# Patient Record
Sex: MALE | Race: WHITE | URBAN - METROPOLITAN AREA
[De-identification: names, ages, dates, MRNs, and addresses within clinical notes are randomized per-mention and may not be internally consistent; named-entity substitution may affect disease eponyms.]

---

## 2020-06-09 ENCOUNTER — OFFICE VISIT (OUTPATIENT)
Dept: URBAN - METROPOLITAN AREA CLINIC 54 | Facility: CLINIC | Age: 55
End: 2020-06-09

## 2020-06-26 ENCOUNTER — TELEPHONE ENCOUNTER (OUTPATIENT)
Dept: URBAN - METROPOLITAN AREA CLINIC 54 | Facility: CLINIC | Age: 55
End: 2020-06-26

## 2020-06-26 RX ORDER — LISINOPRIL 10 MG/1
TABLET ORAL
Qty: 0 | Refills: 0 | Status: ACTIVE | COMMUNITY
Start: 1900-01-01 | End: 1900-01-01

## 2020-06-26 RX ORDER — DICYCLOMINE HYDROCHLORIDE 20 MG/1
TAKE 1 TABLET (20 MG) BY ORAL ROUTE 3 TIMES PER DAY FOR 30 DAYS TABLET ORAL
Qty: 90 | Refills: 7 | Status: ACTIVE | COMMUNITY
Start: 2020-02-07 | End: 2020-10-04

## 2020-06-26 RX ORDER — VEDOLIZUMAB 300 MG/5ML
INFUSE 300 MG OVER 30 MINUTE(S) BY INTRAVENOUS ROUTE EVERY 8 WEEKS INJECTION, POWDER, LYOPHILIZED, FOR SOLUTION INTRAVENOUS
Qty: 1 | Refills: 11 | Status: ACTIVE | COMMUNITY
Start: 2020-02-10 | End: 1900-01-01

## 2020-06-26 RX ORDER — TOPIRAMATE 25 MG/1
TABLET, COATED ORAL
Qty: 0 | Refills: 0 | Status: ACTIVE | COMMUNITY
Start: 1900-01-01 | End: 1900-01-01

## 2020-06-26 RX ORDER — LANSOPRAZOLE 30 MG/1
1 CAPSULE CAPSULE, DELAYED RELEASE ORAL ONCE A DAY
Qty: 90 | Refills: 1 | OUTPATIENT
Start: 2020-06-26

## 2020-06-30 ENCOUNTER — OFFICE VISIT (OUTPATIENT)
Dept: URBAN - METROPOLITAN AREA SURGERY CENTER 14 | Facility: SURGERY CENTER | Age: 55
End: 2020-06-30

## 2020-07-14 ENCOUNTER — TELEPHONE ENCOUNTER (OUTPATIENT)
Dept: URBAN - METROPOLITAN AREA CLINIC 92 | Facility: CLINIC | Age: 55
End: 2020-07-14

## 2020-07-14 RX ORDER — LANSOPRAZOLE 30 MG/1
1 CAPSULE CAPSULE, DELAYED RELEASE ORAL ONCE A DAY
Qty: 90
Start: 2020-06-26

## 2020-07-24 ENCOUNTER — OFFICE VISIT (OUTPATIENT)
Dept: URBAN - METROPOLITAN AREA CLINIC 53 | Facility: CLINIC | Age: 55
End: 2020-07-24
Payer: COMMERCIAL

## 2020-07-24 DIAGNOSIS — K51.80 OTHER ULCERATIVE COLITIS WITHOUT COMPLICATION: ICD-10-CM

## 2020-07-24 PROCEDURE — 96413 CHEMO IV INFUSION 1 HR: CPT | Performed by: INTERNAL MEDICINE

## 2020-07-24 RX ORDER — TOPIRAMATE 25 MG/1
TABLET, COATED ORAL
Qty: 0 | Refills: 0 | Status: ACTIVE | COMMUNITY
Start: 1900-01-01 | End: 1900-01-01

## 2020-07-24 RX ORDER — LANSOPRAZOLE 30 MG/1
1 CAPSULE CAPSULE, DELAYED RELEASE ORAL ONCE A DAY
Qty: 90 | Status: ACTIVE | COMMUNITY
Start: 2020-06-26

## 2020-07-24 RX ORDER — VEDOLIZUMAB 300 MG/5ML
INFUSE 300 MG OVER 30 MINUTE(S) BY INTRAVENOUS ROUTE EVERY 8 WEEKS INJECTION, POWDER, LYOPHILIZED, FOR SOLUTION INTRAVENOUS
Qty: 1 | Refills: 11 | Status: ACTIVE | COMMUNITY
Start: 2020-02-10 | End: 1900-01-01

## 2020-07-24 RX ORDER — LISINOPRIL 10 MG/1
TABLET ORAL
Qty: 0 | Refills: 0 | Status: ACTIVE | COMMUNITY
Start: 1900-01-01 | End: 1900-01-01

## 2020-07-24 RX ORDER — DICYCLOMINE HYDROCHLORIDE 20 MG/1
TAKE 1 TABLET (20 MG) BY ORAL ROUTE 3 TIMES PER DAY FOR 30 DAYS TABLET ORAL
Qty: 90 | Refills: 7 | Status: ACTIVE | COMMUNITY
Start: 2020-02-07 | End: 2020-10-04

## 2020-07-29 ENCOUNTER — OFFICE VISIT (OUTPATIENT)
Dept: URBAN - METROPOLITAN AREA CLINIC 54 | Facility: CLINIC | Age: 55
End: 2020-07-29
Payer: COMMERCIAL

## 2020-07-29 DIAGNOSIS — K51.90 ULCERATIVE COLITIS: ICD-10-CM

## 2020-07-29 PROCEDURE — 99214 OFFICE O/P EST MOD 30 MIN: CPT | Performed by: INTERNAL MEDICINE

## 2020-07-29 RX ORDER — ONDANSETRON 8 MG/1
TABLET, ORALLY DISINTEGRATING ORAL
Qty: 30 UNSPECIFIED | Refills: 0 | Status: ACTIVE | COMMUNITY

## 2020-07-29 RX ORDER — TEMAZEPAM 15 MG/1
(SCHEDULE IV DRUG) CAPSULE ORAL
Qty: 30 CAP | Refills: 0 | Status: ACTIVE | COMMUNITY

## 2020-07-29 RX ORDER — DICYCLOMINE HYDROCHLORIDE 20 MG/1
TAKE 1 TABLET (20 MG) BY ORAL ROUTE 3 TIMES PER DAY FOR 30 DAYS TABLET ORAL
Qty: 90 | Refills: 7 | Status: ACTIVE | COMMUNITY
Start: 2020-02-07 | End: 2020-10-04

## 2020-07-29 RX ORDER — FENOFIBRATE 145 MG/1
TABLET, FILM COATED ORAL
Qty: 90 DELAYED RELEASE TABLET | Refills: 0 | Status: ACTIVE | COMMUNITY

## 2020-07-29 RX ORDER — TOPIRAMATE 25 MG/1
TABLET, COATED ORAL
Qty: 0 | Refills: 0 | Status: ACTIVE | COMMUNITY
Start: 1900-01-01

## 2020-07-29 RX ORDER — LANSOPRAZOLE 30 MG/1
CAPSULE, DELAYED RELEASE ORAL
Qty: 45 CAP | Refills: 0 | Status: ACTIVE | COMMUNITY

## 2020-07-29 RX ORDER — LISINOPRIL 10 MG/1
TABLET ORAL
Qty: 0 | Refills: 0 | Status: ACTIVE | COMMUNITY
Start: 1900-01-01

## 2020-07-29 RX ORDER — RIZATRIPTAN BENZOATE 10 MG/1
TABLET ORAL
Qty: 9 DELAYED RELEASE TABLET | Refills: 2 | Status: ACTIVE | COMMUNITY

## 2020-07-29 RX ORDER — LANSOPRAZOLE 30 MG/1
1 CAPSULE CAPSULE, DELAYED RELEASE ORAL ONCE A DAY
Qty: 90 | Status: DISCONTINUED | COMMUNITY
Start: 2020-06-26

## 2020-07-29 RX ORDER — VEDOLIZUMAB 300 MG/5ML
INFUSE 300 MG OVER 30 MINUTE(S) BY INTRAVENOUS ROUTE EVERY 8 WEEKS INJECTION, POWDER, LYOPHILIZED, FOR SOLUTION INTRAVENOUS
Qty: 1 | Refills: 11 | Status: ACTIVE | COMMUNITY
Start: 2020-02-10

## 2020-07-29 RX ORDER — ERGOCALCIFEROL 1.25 MG/1
CAPSULE ORAL
Qty: 12 CAP | Refills: 0 | Status: ACTIVE | COMMUNITY

## 2020-07-29 NOTE — HPI-OTHER HISTORIES
Altagracia is a 55 yo white man who presents for transition of care. He carries a diagnosis of UC (pancolitis) since 2016. His main complaint has always been abdominal pain with diarrhea but denies rectal bleeding.  He was maintained on Azathiproine which was discontinued due to side effects earlier in 2020. He reports significant relief of abdominal complaints since introduction of Entyvio in March/April 2020. He is on Q 8 weeks frequency. However the last 10 days before his infusion are hard due to recurrence of symptoms. His next infusion in 9/17/20. No fevers, chills, weight loss. He has more energy after being on Entyvio. Last colonoscopy in 2018 showed Arcos 0 disease. No family history of IBD.

## 2020-08-05 ENCOUNTER — LAB OUTSIDE AN ENCOUNTER (OUTPATIENT)
Dept: URBAN - METROPOLITAN AREA CLINIC 54 | Facility: CLINIC | Age: 55
End: 2020-08-05

## 2020-09-10 ENCOUNTER — OFFICE VISIT (OUTPATIENT)
Dept: URBAN - METROPOLITAN AREA CLINIC 53 | Facility: CLINIC | Age: 55
End: 2020-09-10
Payer: COMMERCIAL

## 2020-09-10 ENCOUNTER — TELEPHONE ENCOUNTER (OUTPATIENT)
Dept: URBAN - METROPOLITAN AREA CLINIC 53 | Facility: CLINIC | Age: 55
End: 2020-09-10

## 2020-09-10 DIAGNOSIS — K51.80 CHRONIC PANCOLONIC ULCERATIVE COLITIS: ICD-10-CM

## 2020-09-10 PROCEDURE — 96413 CHEMO IV INFUSION 1 HR: CPT | Performed by: INTERNAL MEDICINE

## 2020-09-10 RX ORDER — TOPIRAMATE 25 MG/1
TABLET, COATED ORAL
Qty: 0 | Refills: 0 | Status: ACTIVE | COMMUNITY
Start: 1900-01-01

## 2020-09-10 RX ORDER — LISINOPRIL 10 MG/1
TABLET ORAL
Qty: 0 | Refills: 0 | Status: ACTIVE | COMMUNITY
Start: 1900-01-01

## 2020-09-10 RX ORDER — DICYCLOMINE HYDROCHLORIDE 20 MG/1
TAKE 1 TABLET (20 MG) BY ORAL ROUTE 3 TIMES PER DAY FOR 30 DAYS TABLET ORAL
Qty: 90 | Refills: 7 | Status: ACTIVE | COMMUNITY
Start: 2020-02-07 | End: 2020-10-04

## 2020-09-10 RX ORDER — ONDANSETRON 8 MG/1
TABLET, ORALLY DISINTEGRATING ORAL
Qty: 30 UNSPECIFIED | Refills: 0 | Status: ACTIVE | COMMUNITY

## 2020-09-10 RX ORDER — ERGOCALCIFEROL 1.25 MG/1
CAPSULE ORAL
Qty: 12 CAP | Refills: 0 | Status: ACTIVE | COMMUNITY

## 2020-09-10 RX ORDER — RIZATRIPTAN BENZOATE 10 MG/1
TABLET ORAL
Qty: 9 DELAYED RELEASE TABLET | Refills: 2 | Status: ACTIVE | COMMUNITY

## 2020-09-10 RX ORDER — LANSOPRAZOLE 30 MG/1
CAPSULE, DELAYED RELEASE ORAL
Qty: 45 CAP | Refills: 0 | Status: ACTIVE | COMMUNITY

## 2020-09-10 RX ORDER — FENOFIBRATE 145 MG/1
TABLET, FILM COATED ORAL
Qty: 90 DELAYED RELEASE TABLET | Refills: 0 | Status: ACTIVE | COMMUNITY

## 2020-09-10 RX ORDER — TEMAZEPAM 15 MG/1
(SCHEDULE IV DRUG) CAPSULE ORAL
Qty: 30 CAP | Refills: 0 | Status: ACTIVE | COMMUNITY

## 2020-09-10 RX ORDER — VEDOLIZUMAB 300 MG/5ML
INFUSE 300 MG OVER 30 MINUTE(S) BY INTRAVENOUS ROUTE EVERY 8 WEEKS INJECTION, POWDER, LYOPHILIZED, FOR SOLUTION INTRAVENOUS
Qty: 1 | Refills: 11 | Status: ACTIVE | COMMUNITY
Start: 2020-02-10

## 2020-10-22 ENCOUNTER — OFFICE VISIT (OUTPATIENT)
Dept: URBAN - METROPOLITAN AREA CLINIC 53 | Facility: CLINIC | Age: 55
End: 2020-10-22
Payer: COMMERCIAL

## 2020-10-22 DIAGNOSIS — K51.80 CHRONIC PANCOLONIC ULCERATIVE COLITIS: ICD-10-CM

## 2020-10-22 PROCEDURE — 96413 CHEMO IV INFUSION 1 HR: CPT | Performed by: INTERNAL MEDICINE

## 2020-10-22 RX ORDER — ONDANSETRON 8 MG/1
TABLET, ORALLY DISINTEGRATING ORAL
Qty: 30 UNSPECIFIED | Refills: 0 | Status: ACTIVE | COMMUNITY

## 2020-10-22 RX ORDER — LISINOPRIL 10 MG/1
TABLET ORAL
Qty: 0 | Refills: 0 | Status: ACTIVE | COMMUNITY
Start: 1900-01-01

## 2020-10-22 RX ORDER — TOPIRAMATE 25 MG/1
TABLET, COATED ORAL
Qty: 0 | Refills: 0 | Status: ACTIVE | COMMUNITY
Start: 1900-01-01

## 2020-10-22 RX ORDER — ERGOCALCIFEROL 1.25 MG/1
CAPSULE ORAL
Qty: 12 CAP | Refills: 0 | Status: ACTIVE | COMMUNITY

## 2020-10-22 RX ORDER — LANSOPRAZOLE 30 MG/1
CAPSULE, DELAYED RELEASE ORAL
Qty: 45 CAP | Refills: 0 | Status: ACTIVE | COMMUNITY

## 2020-10-22 RX ORDER — VEDOLIZUMAB 300 MG/5ML
INFUSE 300 MG OVER 30 MINUTE(S) BY INTRAVENOUS ROUTE EVERY 8 WEEKS INJECTION, POWDER, LYOPHILIZED, FOR SOLUTION INTRAVENOUS
Qty: 1 | Refills: 11 | Status: ACTIVE | COMMUNITY
Start: 2020-02-10

## 2020-10-22 RX ORDER — FENOFIBRATE 145 MG/1
TABLET, FILM COATED ORAL
Qty: 90 DELAYED RELEASE TABLET | Refills: 0 | Status: ACTIVE | COMMUNITY

## 2020-10-22 RX ORDER — TEMAZEPAM 15 MG/1
(SCHEDULE IV DRUG) CAPSULE ORAL
Qty: 30 CAP | Refills: 0 | Status: ACTIVE | COMMUNITY

## 2020-10-22 RX ORDER — RIZATRIPTAN BENZOATE 10 MG/1
TABLET ORAL
Qty: 9 DELAYED RELEASE TABLET | Refills: 2 | Status: ACTIVE | COMMUNITY

## 2020-11-18 ENCOUNTER — TELEPHONE ENCOUNTER (OUTPATIENT)
Dept: URBAN - METROPOLITAN AREA CLINIC 92 | Facility: CLINIC | Age: 55
End: 2020-11-18

## 2020-11-18 RX ORDER — LANSOPRAZOLE 30 MG/1
1 CAPSULE BEFORE A MEAL CAPSULE, DELAYED RELEASE ORAL ONCE A DAY
Qty: 90 CAPSULE | Refills: 1

## 2020-12-03 ENCOUNTER — CLAIMS CREATED FROM THE CLAIM WINDOW (OUTPATIENT)
Dept: URBAN - METROPOLITAN AREA CLINIC 53 | Facility: CLINIC | Age: 55
End: 2020-12-03
Payer: COMMERCIAL

## 2020-12-03 DIAGNOSIS — K51.80 CHRONIC PANCOLONIC ULCERATIVE COLITIS: ICD-10-CM

## 2020-12-03 PROCEDURE — 96413 CHEMO IV INFUSION 1 HR: CPT | Performed by: INTERNAL MEDICINE

## 2020-12-03 RX ORDER — FENOFIBRATE 145 MG/1
TABLET, FILM COATED ORAL
Qty: 90 DELAYED RELEASE TABLET | Refills: 0 | Status: ACTIVE | COMMUNITY

## 2020-12-03 RX ORDER — VEDOLIZUMAB 300 MG/5ML
INFUSE 300 MG OVER 30 MINUTE(S) BY INTRAVENOUS ROUTE EVERY 8 WEEKS INJECTION, POWDER, LYOPHILIZED, FOR SOLUTION INTRAVENOUS
Qty: 1 | Refills: 11 | Status: ACTIVE | COMMUNITY
Start: 2020-02-10

## 2020-12-03 RX ORDER — ERGOCALCIFEROL 1.25 MG/1
CAPSULE ORAL
Qty: 12 CAP | Refills: 0 | Status: ACTIVE | COMMUNITY

## 2020-12-03 RX ORDER — LANSOPRAZOLE 30 MG/1
1 CAPSULE BEFORE A MEAL CAPSULE, DELAYED RELEASE ORAL ONCE A DAY
Qty: 90 CAPSULE | Refills: 1 | Status: ACTIVE | COMMUNITY

## 2020-12-03 RX ORDER — RIZATRIPTAN BENZOATE 10 MG/1
TABLET ORAL
Qty: 9 DELAYED RELEASE TABLET | Refills: 2 | Status: ACTIVE | COMMUNITY

## 2020-12-03 RX ORDER — ONDANSETRON 8 MG/1
TABLET, ORALLY DISINTEGRATING ORAL
Qty: 30 UNSPECIFIED | Refills: 0 | Status: ACTIVE | COMMUNITY

## 2020-12-03 RX ORDER — LISINOPRIL 10 MG/1
TABLET ORAL
Qty: 0 | Refills: 0 | Status: ACTIVE | COMMUNITY
Start: 1900-01-01

## 2020-12-03 RX ORDER — TEMAZEPAM 15 MG/1
(SCHEDULE IV DRUG) CAPSULE ORAL
Qty: 30 CAP | Refills: 0 | Status: ACTIVE | COMMUNITY

## 2020-12-03 RX ORDER — TOPIRAMATE 25 MG/1
TABLET, COATED ORAL
Qty: 0 | Refills: 0 | Status: ACTIVE | COMMUNITY
Start: 1900-01-01

## 2021-01-11 ENCOUNTER — TELEPHONE ENCOUNTER (OUTPATIENT)
Dept: URBAN - METROPOLITAN AREA CLINIC 54 | Facility: CLINIC | Age: 56
End: 2021-01-11

## 2021-01-28 ENCOUNTER — OFFICE VISIT (OUTPATIENT)
Dept: URBAN - METROPOLITAN AREA CLINIC 53 | Facility: CLINIC | Age: 56
End: 2021-01-28
Payer: COMMERCIAL

## 2021-01-28 DIAGNOSIS — K51.80 CHRONIC PANCOLONIC ULCERATIVE COLITIS: ICD-10-CM

## 2021-01-28 PROCEDURE — 96365 THER/PROPH/DIAG IV INF INIT: CPT | Performed by: INTERNAL MEDICINE

## 2021-01-28 RX ORDER — LISINOPRIL 10 MG/1
TABLET ORAL
Qty: 0 | Refills: 0 | Status: ACTIVE | COMMUNITY
Start: 1900-01-01

## 2021-01-28 RX ORDER — LANSOPRAZOLE 30 MG/1
1 CAPSULE BEFORE A MEAL CAPSULE, DELAYED RELEASE ORAL ONCE A DAY
Qty: 90 CAPSULE | Refills: 1 | Status: ACTIVE | COMMUNITY

## 2021-01-28 RX ORDER — FENOFIBRATE 145 MG/1
TABLET, FILM COATED ORAL
Qty: 90 DELAYED RELEASE TABLET | Refills: 0 | Status: ACTIVE | COMMUNITY

## 2021-01-28 RX ORDER — ONDANSETRON 8 MG/1
TABLET, ORALLY DISINTEGRATING ORAL
Qty: 30 UNSPECIFIED | Refills: 0 | Status: ACTIVE | COMMUNITY

## 2021-01-28 RX ORDER — ERGOCALCIFEROL 1.25 MG/1
CAPSULE ORAL
Qty: 12 CAP | Refills: 0 | Status: ACTIVE | COMMUNITY

## 2021-01-28 RX ORDER — TOPIRAMATE 25 MG/1
TABLET, COATED ORAL
Qty: 0 | Refills: 0 | Status: ACTIVE | COMMUNITY
Start: 1900-01-01

## 2021-01-28 RX ORDER — TEMAZEPAM 15 MG/1
(SCHEDULE IV DRUG) CAPSULE ORAL
Qty: 30 CAP | Refills: 0 | Status: ACTIVE | COMMUNITY

## 2021-01-28 RX ORDER — VEDOLIZUMAB 300 MG/5ML
INFUSE 300 MG OVER 30 MINUTE(S) BY INTRAVENOUS ROUTE EVERY 8 WEEKS INJECTION, POWDER, LYOPHILIZED, FOR SOLUTION INTRAVENOUS
Qty: 1 | Refills: 11 | Status: ACTIVE | COMMUNITY
Start: 2020-02-10

## 2021-01-28 RX ORDER — RIZATRIPTAN BENZOATE 10 MG/1
TABLET ORAL
Qty: 9 DELAYED RELEASE TABLET | Refills: 2 | Status: ACTIVE | COMMUNITY

## 2021-02-25 ENCOUNTER — OFFICE VISIT (OUTPATIENT)
Dept: URBAN - METROPOLITAN AREA CLINIC 53 | Facility: CLINIC | Age: 56
End: 2021-02-25

## 2021-03-11 ENCOUNTER — OFFICE VISIT (OUTPATIENT)
Dept: URBAN - METROPOLITAN AREA CLINIC 53 | Facility: CLINIC | Age: 56
End: 2021-03-11
Payer: COMMERCIAL

## 2021-03-11 ENCOUNTER — TELEPHONE ENCOUNTER (OUTPATIENT)
Dept: URBAN - METROPOLITAN AREA CLINIC 54 | Facility: CLINIC | Age: 56
End: 2021-03-11

## 2021-03-11 DIAGNOSIS — K51.80 CHRONIC PANCOLONIC ULCERATIVE COLITIS: ICD-10-CM

## 2021-03-11 PROCEDURE — 96365 THER/PROPH/DIAG IV INF INIT: CPT | Performed by: INTERNAL MEDICINE

## 2021-03-11 RX ORDER — VEDOLIZUMAB 300 MG/5ML
INFUSE 300 MG OVER 30 MINUTE(S) BY INTRAVENOUS ROUTE EVERY 8 WEEKS INJECTION, POWDER, LYOPHILIZED, FOR SOLUTION INTRAVENOUS
Qty: 1 | Refills: 11 | Status: ACTIVE | COMMUNITY
Start: 2020-02-10

## 2021-03-11 RX ORDER — ONDANSETRON 8 MG/1
TABLET, ORALLY DISINTEGRATING ORAL
Qty: 30 UNSPECIFIED | Refills: 0 | Status: ACTIVE | COMMUNITY

## 2021-03-11 RX ORDER — RIZATRIPTAN BENZOATE 10 MG/1
TABLET ORAL
Qty: 9 DELAYED RELEASE TABLET | Refills: 2 | Status: ACTIVE | COMMUNITY

## 2021-03-11 RX ORDER — TOPIRAMATE 25 MG/1
TABLET, COATED ORAL
Qty: 0 | Refills: 0 | Status: ACTIVE | COMMUNITY
Start: 1900-01-01

## 2021-03-11 RX ORDER — FENOFIBRATE 145 MG/1
TABLET, FILM COATED ORAL
Qty: 90 DELAYED RELEASE TABLET | Refills: 0 | Status: ACTIVE | COMMUNITY

## 2021-03-11 RX ORDER — ERGOCALCIFEROL 1.25 MG/1
CAPSULE ORAL
Qty: 12 CAP | Refills: 0 | Status: ACTIVE | COMMUNITY

## 2021-03-11 RX ORDER — TEMAZEPAM 15 MG/1
(SCHEDULE IV DRUG) CAPSULE ORAL
Qty: 30 CAP | Refills: 0 | Status: ACTIVE | COMMUNITY

## 2021-03-11 RX ORDER — LISINOPRIL 10 MG/1
TABLET ORAL
Qty: 0 | Refills: 0 | Status: ACTIVE | COMMUNITY
Start: 1900-01-01

## 2021-03-11 RX ORDER — LANSOPRAZOLE 30 MG/1
1 CAPSULE BEFORE A MEAL CAPSULE, DELAYED RELEASE ORAL ONCE A DAY
Qty: 90 CAPSULE | Refills: 1 | Status: ACTIVE | COMMUNITY

## 2021-03-17 ENCOUNTER — OFFICE VISIT (OUTPATIENT)
Dept: URBAN - NONMETROPOLITAN AREA CLINIC 4 | Facility: CLINIC | Age: 56
End: 2021-03-17
Payer: COMMERCIAL

## 2021-03-17 ENCOUNTER — LAB OUTSIDE AN ENCOUNTER (OUTPATIENT)
Dept: URBAN - NONMETROPOLITAN AREA CLINIC 4 | Facility: CLINIC | Age: 56
End: 2021-03-17

## 2021-03-17 ENCOUNTER — WEB ENCOUNTER (OUTPATIENT)
Dept: URBAN - NONMETROPOLITAN AREA CLINIC 4 | Facility: CLINIC | Age: 56
End: 2021-03-17

## 2021-03-17 VITALS
HEART RATE: 89 BPM | HEIGHT: 74 IN | DIASTOLIC BLOOD PRESSURE: 88 MMHG | SYSTOLIC BLOOD PRESSURE: 140 MMHG | WEIGHT: 211 LBS | TEMPERATURE: 96.8 F | BODY MASS INDEX: 27.08 KG/M2

## 2021-03-17 DIAGNOSIS — K51.90 ULCERATIVE COLITIS: ICD-10-CM

## 2021-03-17 PROCEDURE — 99214 OFFICE O/P EST MOD 30 MIN: CPT | Performed by: REGISTERED NURSE

## 2021-03-17 RX ORDER — ONDANSETRON 8 MG/1
TABLET, ORALLY DISINTEGRATING ORAL
Qty: 30 UNSPECIFIED | Refills: 0 | COMMUNITY

## 2021-03-17 RX ORDER — LISINOPRIL 10 MG/1
TABLET ORAL
Qty: 0 | Refills: 0 | COMMUNITY

## 2021-03-17 RX ORDER — VEDOLIZUMAB 300 MG/5ML
INFUSE 300 MG OVER 30 MINUTE(S) BY INTRAVENOUS ROUTE EVERY 8 WEEKS INJECTION, POWDER, LYOPHILIZED, FOR SOLUTION INTRAVENOUS
Qty: 1 | Refills: 11 | COMMUNITY

## 2021-03-17 RX ORDER — RIZATRIPTAN BENZOATE 10 MG/1
TABLET ORAL
Qty: 9 DELAYED RELEASE TABLET | Refills: 2 | COMMUNITY

## 2021-03-17 RX ORDER — PREDNISONE 10 MG/1
1 TABLET TABLET ORAL AS DIRECTED
Qty: 70 | Refills: 0 | OUTPATIENT
Start: 2021-03-17 | End: 2021-04-16

## 2021-03-17 RX ORDER — LANSOPRAZOLE 30 MG/1
1 CAPSULE BEFORE A MEAL CAPSULE, DELAYED RELEASE ORAL ONCE A DAY
Qty: 90 CAPSULE | Refills: 1 | COMMUNITY

## 2021-03-17 RX ORDER — PREDNISONE 10 MG/1
AS DIRECTED TABLET ORAL
Qty: 70 | Refills: 0 | OUTPATIENT
Start: 2021-03-17 | End: 2021-03-17

## 2021-03-17 RX ORDER — ERGOCALCIFEROL 1.25 MG/1
CAPSULE ORAL
Qty: 12 CAP | Refills: 0 | COMMUNITY

## 2021-03-17 RX ORDER — TOPIRAMATE 25 MG/1
TABLET, COATED ORAL
Qty: 0 | Refills: 0 | COMMUNITY

## 2021-03-17 RX ORDER — TEMAZEPAM 15 MG/1
(SCHEDULE IV DRUG) CAPSULE ORAL
Qty: 30 CAP | Refills: 0 | COMMUNITY

## 2021-03-17 RX ORDER — FENOFIBRATE 145 MG/1
TABLET, FILM COATED ORAL
Qty: 90 DELAYED RELEASE TABLET | Refills: 0 | COMMUNITY

## 2021-03-17 NOTE — HPI-OTHER HISTORIES
Altagracia is a 55 yo white man who presents for transition of care. He carries a diagnosis of UC (pancolitis) since 2016. His main complaint has always been abdominal pain with diarrhea but denies rectal bleeding.  He was maintained on Azathiproine which was discontinued due to side effects earlier in 2020. He reports significant relief of abdominal complaints since introduction of Entyvio in March/April 2020. He is on Q 8 weeks frequency. However the last 10 days before his infusion are hard due to recurrence of symptoms. His next infusion in 9/17/20. No fevers, chills, weight loss. He has more energy after being on Entyvio. Last colonoscopy in 2018 showed Arcos 0 disease. No family history of IBD.   3/17/21: Pt presents for urgent visit. He reports not feeling well since January after missing Entyvio inf in Dec. He has lost nearly 10 lbs since then. His last inf was on 3/11 and since then, he report worsening abdominal pain, N/V, diarrhea and hematochezia. His wife tried to get him to go to ER, but he refused. He reports 3 episodes of hematemesis last week. He has been taking Zofran and Phenergan as needed. He has not had a BM since Sunday, but continues to c/o diffuse abdominal pain. His last colonoscopy was in 2018.

## 2021-03-17 NOTE — PHYSICAL EXAM GASTROINTESTINAL
Abdomen , soft, moderate diffuse TTP, nondistended , no guarding or rigidity , no masses palpable , normal bowel sounds , Liver and Spleen , no hepatomegaly present , no hepatosplenomegaly , liver nontender , spleen not palpable

## 2021-03-18 ENCOUNTER — LAB OUTSIDE AN ENCOUNTER (OUTPATIENT)
Dept: URBAN - METROPOLITAN AREA CLINIC 54 | Facility: CLINIC | Age: 56
End: 2021-03-18

## 2021-03-18 ENCOUNTER — TELEPHONE ENCOUNTER (OUTPATIENT)
Dept: URBAN - NONMETROPOLITAN AREA CLINIC 4 | Facility: CLINIC | Age: 56
End: 2021-03-18

## 2021-03-22 LAB — CALPROTECTIN, FECAL: 30

## 2021-03-26 ENCOUNTER — TELEPHONE ENCOUNTER (OUTPATIENT)
Dept: URBAN - METROPOLITAN AREA CLINIC 23 | Facility: CLINIC | Age: 56
End: 2021-03-26

## 2021-03-29 ENCOUNTER — TELEPHONE ENCOUNTER (OUTPATIENT)
Dept: URBAN - METROPOLITAN AREA CLINIC 92 | Facility: CLINIC | Age: 56
End: 2021-03-29

## 2021-03-29 ENCOUNTER — OFFICE VISIT (OUTPATIENT)
Dept: URBAN - METROPOLITAN AREA MEDICAL CENTER 23 | Facility: MEDICAL CENTER | Age: 56
End: 2021-03-29
Payer: COMMERCIAL

## 2021-03-29 DIAGNOSIS — K92.0 BLOODY EMESIS: ICD-10-CM

## 2021-03-29 DIAGNOSIS — K29.30 CHRONIC SUPERFICIAL GASTRITIS: ICD-10-CM

## 2021-03-29 DIAGNOSIS — K51.90 ACUTE ULCERATIVE COLITIS: ICD-10-CM

## 2021-03-29 DIAGNOSIS — D12.3 ADENOMA OF TRANSVERSE COLON: ICD-10-CM

## 2021-03-29 DIAGNOSIS — K62.1 DYSPLASTIC POLYP OF RECTUM: ICD-10-CM

## 2021-03-29 PROCEDURE — 45380 COLONOSCOPY AND BIOPSY: CPT | Performed by: INTERNAL MEDICINE

## 2021-03-29 PROCEDURE — 43239 EGD BIOPSY SINGLE/MULTIPLE: CPT | Performed by: INTERNAL MEDICINE

## 2021-03-29 RX ORDER — TEMAZEPAM 15 MG/1
(SCHEDULE IV DRUG) CAPSULE ORAL
Qty: 30 CAP | Refills: 0 | COMMUNITY

## 2021-03-29 RX ORDER — LISINOPRIL 10 MG/1
TABLET ORAL
Qty: 0 | Refills: 0 | COMMUNITY

## 2021-03-29 RX ORDER — TOPIRAMATE 25 MG/1
TABLET, COATED ORAL
Qty: 0 | Refills: 0 | COMMUNITY

## 2021-03-29 RX ORDER — VEDOLIZUMAB 300 MG/5ML
INFUSE 300 MG OVER 30 MINUTE(S) BY INTRAVENOUS ROUTE EVERY 8 WEEKS INJECTION, POWDER, LYOPHILIZED, FOR SOLUTION INTRAVENOUS
Qty: 1 | Refills: 11 | COMMUNITY

## 2021-03-29 RX ORDER — PREDNISONE 10 MG/1
1 TABLET TABLET ORAL AS DIRECTED
Qty: 70 | Refills: 0 | Status: ACTIVE | COMMUNITY
Start: 2021-03-17 | End: 2021-04-16

## 2021-03-29 RX ORDER — LANSOPRAZOLE 30 MG/1
1 CAPSULE BEFORE A MEAL CAPSULE, DELAYED RELEASE ORAL ONCE A DAY
Qty: 90 CAPSULE | Refills: 1 | COMMUNITY

## 2021-03-29 RX ORDER — ERGOCALCIFEROL 1.25 MG/1
CAPSULE ORAL
Qty: 12 CAP | Refills: 0 | COMMUNITY

## 2021-03-29 RX ORDER — ONDANSETRON 8 MG/1
TABLET, ORALLY DISINTEGRATING ORAL
Qty: 30 UNSPECIFIED | Refills: 0 | COMMUNITY

## 2021-03-29 RX ORDER — RIZATRIPTAN BENZOATE 10 MG/1
TABLET ORAL
Qty: 9 DELAYED RELEASE TABLET | Refills: 2 | COMMUNITY

## 2021-03-29 RX ORDER — FENOFIBRATE 145 MG/1
TABLET, FILM COATED ORAL
Qty: 90 DELAYED RELEASE TABLET | Refills: 0 | COMMUNITY

## 2021-03-31 LAB
A/G RATIO: 2.3
ALBUMIN: 5.2
ALKALINE PHOSPHATASE: 66
ALT (SGPT): 27
ANTI-VEDOLIZUMAB ANTIBODY: <25
AST (SGOT): 22
BILIRUBIN, TOTAL: 0.5
BUN/CREATININE RATIO: 14
BUN: 21
C-REACTIVE PROTEIN, QUANT: 1
CALCIUM: 10.2
CARBON DIOXIDE, TOTAL: 23
CHLORIDE: 104
CREATININE: 1.45
EGFR IF AFRICN AM: 62
EGFR IF NONAFRICN AM: 53
GLOBULIN, TOTAL: 2.3
GLUCOSE: 99
HEMATOCRIT: 48.6
HEMOGLOBIN: 17
MCH: 32.1
MCHC: 35
MCV: 92
NRBC: (no result)
PLATELETS: 338
POTASSIUM: 4.8
PROTEIN, TOTAL: 7.5
RBC: 5.3
RDW: 13
SODIUM: 141
VEDOLIZUMAB: 79
WBC: 7.3

## 2021-04-07 ENCOUNTER — TELEPHONE ENCOUNTER (OUTPATIENT)
Dept: URBAN - NONMETROPOLITAN AREA CLINIC 4 | Facility: CLINIC | Age: 56
End: 2021-04-07

## 2021-04-14 ENCOUNTER — TELEPHONE ENCOUNTER (OUTPATIENT)
Dept: URBAN - METROPOLITAN AREA SURGERY CENTER 30 | Facility: SURGERY CENTER | Age: 56
End: 2021-04-14

## 2021-04-22 ENCOUNTER — OFFICE VISIT (OUTPATIENT)
Dept: URBAN - METROPOLITAN AREA CLINIC 53 | Facility: CLINIC | Age: 56
End: 2021-04-22

## 2021-05-03 ENCOUNTER — TELEPHONE ENCOUNTER (OUTPATIENT)
Dept: URBAN - METROPOLITAN AREA CLINIC 54 | Facility: CLINIC | Age: 56
End: 2021-05-03

## 2021-05-17 ENCOUNTER — TELEPHONE ENCOUNTER (OUTPATIENT)
Dept: URBAN - METROPOLITAN AREA CLINIC 54 | Facility: CLINIC | Age: 56
End: 2021-05-17

## 2021-06-03 ENCOUNTER — WEB ENCOUNTER (OUTPATIENT)
Dept: URBAN - METROPOLITAN AREA CLINIC 54 | Facility: CLINIC | Age: 56
End: 2021-06-03

## 2021-06-03 ENCOUNTER — OFFICE VISIT (OUTPATIENT)
Dept: URBAN - METROPOLITAN AREA CLINIC 54 | Facility: CLINIC | Age: 56
End: 2021-06-03
Payer: COMMERCIAL

## 2021-06-03 ENCOUNTER — OFFICE VISIT (OUTPATIENT)
Dept: URBAN - METROPOLITAN AREA CLINIC 53 | Facility: CLINIC | Age: 56
End: 2021-06-03

## 2021-06-03 DIAGNOSIS — R10.32 LLQ PAIN: ICD-10-CM

## 2021-06-03 DIAGNOSIS — K51.90 ULCERATIVE COLITIS: ICD-10-CM

## 2021-06-03 PROCEDURE — 99214 OFFICE O/P EST MOD 30 MIN: CPT | Performed by: INTERNAL MEDICINE

## 2021-06-03 RX ORDER — TOPIRAMATE 25 MG/1
TABLET, COATED ORAL
Qty: 0 | Refills: 0 | COMMUNITY

## 2021-06-03 RX ORDER — ONDANSETRON 8 MG/1
TABLET, ORALLY DISINTEGRATING ORAL
Qty: 30 UNSPECIFIED | Refills: 0 | COMMUNITY

## 2021-06-03 RX ORDER — TEMAZEPAM 15 MG/1
(SCHEDULE IV DRUG) CAPSULE ORAL
Qty: 30 CAP | Refills: 0 | COMMUNITY

## 2021-06-03 RX ORDER — FENOFIBRATE 145 MG/1
TABLET, FILM COATED ORAL
Qty: 90 DELAYED RELEASE TABLET | Refills: 0 | COMMUNITY

## 2021-06-03 RX ORDER — LISINOPRIL 10 MG/1
TABLET ORAL
Qty: 0 | Refills: 0 | COMMUNITY

## 2021-06-03 RX ORDER — RIZATRIPTAN BENZOATE 10 MG/1
TABLET ORAL
Qty: 9 DELAYED RELEASE TABLET | Refills: 2 | COMMUNITY

## 2021-06-03 RX ORDER — VEDOLIZUMAB 300 MG/5ML
INFUSE 300 MG OVER 30 MINUTE(S) BY INTRAVENOUS ROUTE EVERY 8 WEEKS INJECTION, POWDER, LYOPHILIZED, FOR SOLUTION INTRAVENOUS
Qty: 1 | Refills: 11 | COMMUNITY

## 2021-06-03 RX ORDER — ERGOCALCIFEROL 1.25 MG/1
CAPSULE ORAL
Qty: 12 CAP | Refills: 0 | COMMUNITY

## 2021-06-03 RX ORDER — LANSOPRAZOLE 30 MG/1
1 CAPSULE BEFORE A MEAL CAPSULE, DELAYED RELEASE ORAL ONCE A DAY
Qty: 90 CAPSULE | Refills: 1 | COMMUNITY

## 2021-06-03 NOTE — HPI-OTHER HISTORIES
Altagracia is a 53 yo white man who presents for transition of care. He carries a diagnosis of UC (pancolitis) since 2016. His main complaint has always been abdominal pain with diarrhea but denies rectal bleeding.  He was maintained on Azathiproine which was discontinued due to side effects earlier in 2020. He reports significant relief of abdominal complaints since introduction of Entyvio in March/April 2020. He is on Q 8 weeks frequency. However the last 10 days before his infusion are hard due to recurrence of symptoms. His next infusion in 9/17/20. No fevers, chills, weight loss. He has more energy after being on Entyvio. Last colonoscopy in 2018 showed Arcos 0 disease. No family history of IBD.   3/17/21: Pt presents for urgent visit. He reports not feeling well since January after missing Entyvio inf in Dec. He has lost nearly 10 lbs since then. His last inf was on 3/11 and since then, he report worsening abdominal pain, N/V, diarrhea and hematochezia. His wife tried to get him to go to ER, but he refused. He reports 3 episodes of hematemesis last week. He has been taking Zofran and Phenergan as needed. He has not had a BM since Sunday, but continues to c/o diffuse abdominal pain. His last colonoscopy was in 2018.  Follow Up 6/3/21: Patient presents for routine follow up. He received Entyvio 2 weeks ago and developed headache, nausea and rectal bleeding soon afterward. He had to visit ED. These symptoms lasted 3 days and resolved with Phenergan, Bentyl and Zofran. He does not feel quiet 100% yet. EGD and Colonoscopy were unremarkable in March 2021. He completed a prednisone taper in March 2021 (no GI symptoms on prednisone). FC normal in March 2021. He denies any fever, chills, weight loss.

## 2021-07-15 ENCOUNTER — OFFICE VISIT (OUTPATIENT)
Dept: URBAN - METROPOLITAN AREA CLINIC 53 | Facility: CLINIC | Age: 56
End: 2021-07-15

## 2021-09-09 ENCOUNTER — OUT OF OFFICE VISIT (OUTPATIENT)
Dept: URBAN - METROPOLITAN AREA MEDICAL CENTER 23 | Facility: MEDICAL CENTER | Age: 56
End: 2021-09-09
Payer: COMMERCIAL

## 2021-09-09 DIAGNOSIS — R10.31 ABDOMINAL CRAMPING IN RIGHT LOWER QUADRANT: ICD-10-CM

## 2021-09-09 DIAGNOSIS — R19.7 ACUTE DIARRHEA: ICD-10-CM

## 2021-09-09 DIAGNOSIS — R11.2 ACUTE NAUSEA WITH NONBILIOUS VOMITING: ICD-10-CM

## 2021-09-09 DIAGNOSIS — N17.9 ACUTE INJURY OF KIDNEY: ICD-10-CM

## 2021-09-09 PROCEDURE — 99215 OFFICE O/P EST HI 40 MIN: CPT | Performed by: INTERNAL MEDICINE

## 2022-04-15 ENCOUNTER — WEB ENCOUNTER (OUTPATIENT)
Dept: URBAN - METROPOLITAN AREA CLINIC 54 | Facility: CLINIC | Age: 57
End: 2022-04-15

## 2022-04-15 ENCOUNTER — WEB ENCOUNTER (OUTPATIENT)
Dept: URBAN - NONMETROPOLITAN AREA CLINIC 4 | Facility: CLINIC | Age: 57
End: 2022-04-15

## 2022-04-19 ENCOUNTER — OFFICE VISIT (OUTPATIENT)
Dept: URBAN - NONMETROPOLITAN AREA CLINIC 4 | Facility: CLINIC | Age: 57
End: 2022-04-19
Payer: COMMERCIAL

## 2022-04-19 VITALS
HEIGHT: 74 IN | SYSTOLIC BLOOD PRESSURE: 138 MMHG | DIASTOLIC BLOOD PRESSURE: 85 MMHG | HEART RATE: 78 BPM | WEIGHT: 214.6 LBS | TEMPERATURE: 97.9 F | BODY MASS INDEX: 27.54 KG/M2

## 2022-04-19 DIAGNOSIS — R10.32 LLQ PAIN: ICD-10-CM

## 2022-04-19 DIAGNOSIS — R93.89 ABNORMAL CT SCAN: ICD-10-CM

## 2022-04-19 DIAGNOSIS — K51.90 ULCERATIVE COLITIS: ICD-10-CM

## 2022-04-19 DIAGNOSIS — R19.7 DIARRHEA, UNSPECIFIED TYPE: ICD-10-CM

## 2022-04-19 DIAGNOSIS — K57.92 DIVERTICULITIS: ICD-10-CM

## 2022-04-19 PROBLEM — 307496006: Status: ACTIVE | Noted: 2022-04-19

## 2022-04-19 PROBLEM — 129679001: Status: ACTIVE | Noted: 2022-04-19

## 2022-04-19 PROCEDURE — 99214 OFFICE O/P EST MOD 30 MIN: CPT | Performed by: REGISTERED NURSE

## 2022-04-19 RX ORDER — FENOFIBRATE 145 MG/1
TABLET, FILM COATED ORAL
Qty: 90 DELAYED RELEASE TABLET | Refills: 0 | COMMUNITY

## 2022-04-19 RX ORDER — METOCLOPRAMIDE 10 MG/1
1 TABLET BEFORE STARTING COLON PREP TABLET ORAL ONCE
Qty: 1 TABLET | Refills: 0 | OUTPATIENT
Start: 2022-04-19

## 2022-04-19 RX ORDER — LISINOPRIL 10 MG/1
TABLET ORAL
Qty: 0 | Refills: 0 | COMMUNITY

## 2022-04-19 RX ORDER — LANSOPRAZOLE 30 MG/1
1 CAPSULE BEFORE A MEAL CAPSULE, DELAYED RELEASE ORAL ONCE A DAY
Qty: 90 CAPSULE | Refills: 1 | COMMUNITY

## 2022-04-19 RX ORDER — VEDOLIZUMAB 300 MG/5ML
INFUSE 300 MG OVER 30 MINUTE(S) BY INTRAVENOUS ROUTE EVERY 8 WEEKS INJECTION, POWDER, LYOPHILIZED, FOR SOLUTION INTRAVENOUS
Qty: 1 | Refills: 11 | COMMUNITY

## 2022-04-19 RX ORDER — SODIUM, POTASSIUM,MAG SULFATES 17.5-3.13G
177ML SOLUTION, RECONSTITUTED, ORAL ORAL ONCE A DAY
Qty: 354 ML | Refills: 0 | OUTPATIENT
Start: 2022-04-19 | End: 2022-04-21

## 2022-04-19 RX ORDER — TOPIRAMATE 25 MG/1
TABLET, COATED ORAL
Qty: 0 | Refills: 0 | COMMUNITY

## 2022-04-19 RX ORDER — RIZATRIPTAN BENZOATE 10 MG/1
TABLET ORAL
Qty: 9 DELAYED RELEASE TABLET | Refills: 2 | COMMUNITY

## 2022-04-19 RX ORDER — ERGOCALCIFEROL 1.25 MG/1
CAPSULE ORAL
Qty: 12 CAP | Refills: 0 | COMMUNITY

## 2022-04-19 RX ORDER — TEMAZEPAM 15 MG/1
(SCHEDULE IV DRUG) CAPSULE ORAL
Qty: 30 CAP | Refills: 0 | COMMUNITY

## 2022-04-19 RX ORDER — ONDANSETRON 8 MG/1
TABLET, ORALLY DISINTEGRATING ORAL
Qty: 30 UNSPECIFIED | Refills: 0 | COMMUNITY

## 2022-04-19 NOTE — HPI-OTHER HISTORIES
Altagracia is a 55 yo white man who presents for transition of care. He carries a diagnosis of UC (pancolitis) since 2016. His main complaint has always been abdominal pain with diarrhea but denies rectal bleeding.  He was maintained on Azathiproine which was discontinued due to side effects earlier in 2020. He reports significant relief of abdominal complaints since introduction of Entyvio in March/April 2020. He is on Q 8 weeks frequency. However the last 10 days before his infusion are hard due to recurrence of symptoms. His next infusion in 9/17/20. No fevers, chills, weight loss. He has more energy after being on Entyvio. Last colonoscopy in 2018 showed Arcos 0 disease. No family history of IBD.   3/17/21: Pt presents for urgent visit. He reports not feeling well since January after missing Entyvio inf in Dec. He has lost nearly 10 lbs since then. His last inf was on 3/11 and since then, he report worsening abdominal pain, N/V, diarrhea and hematochezia. His wife tried to get him to go to ER, but he refused. He reports 3 episodes of hematemesis last week. He has been taking Zofran and Phenergan as needed. He has not had a BM since Sunday, but continues to c/o diffuse abdominal pain. His last colonoscopy was in 2018.  Follow Up 6/3/21: Patient presents for routine follow up. He received Entyvio 2 weeks ago and developed headache, nausea and rectal bleeding soon afterward. He had to visit ED. These symptoms lasted 3 days and resolved with Phenergan, Bentyl and Zofran. He does not feel quiet 100% yet. EGD and Colonoscopy were unremarkable in March 2021. He completed a prednisone taper in March 2021 (no GI symptoms on prednisone). FC normal in March 2021. He denies any fever, chills, weight loss.  4/19/22: Pt presents for f/u of divertculitis. Labs and CT scan ordered by PCP recently.  Per labs 4/12/22, CRP elevated at 4.43 and sed rate elevated at 20. CBC and CMP wnl.   CT A/P 4/12/22 IMPRESSION: 1. Thickening of wall of unopacified distal descending and proximal sigmoid colon with diverticula noted within these segments. Pericolonic inflammation noted as well as reactive thickening of peritoneal fascia related to inflammation. Findings consistent with acute uncomplicated diverticulitis with epicenter near junction of distal descending and proximal sigmoid colonic segments. 2. Slight uniform thickening of wall of distal sigmoid colon and rectum likely on the basis of changes associated with ulcerative colitis. Unopacified right colon and terminal ileum appear grossly normal. 3. No abscess, free air, or free fluid noted. 4. Fatty liver.   He completed Cipro/Flagyl. He feels better overall, but continues to c/o loose stools. Stools are pudding consistency. Went from having over 10 BMs daily to now having 4-6 BMs daily. Denies hematochezia. He has mild LLQ pain. Denies N/V.

## 2022-04-22 LAB — CALPROTECTIN, FECAL: 27

## 2022-05-05 ENCOUNTER — WEB ENCOUNTER (OUTPATIENT)
Dept: URBAN - METROPOLITAN AREA CLINIC 54 | Facility: CLINIC | Age: 57
End: 2022-05-05

## 2022-05-10 ENCOUNTER — TELEPHONE ENCOUNTER (OUTPATIENT)
Dept: URBAN - METROPOLITAN AREA CLINIC 92 | Facility: CLINIC | Age: 57
End: 2022-05-10

## 2022-05-10 ENCOUNTER — LAB OUTSIDE AN ENCOUNTER (OUTPATIENT)
Dept: URBAN - NONMETROPOLITAN AREA CLINIC 4 | Facility: CLINIC | Age: 57
End: 2022-05-10

## 2022-05-10 ENCOUNTER — OFFICE VISIT (OUTPATIENT)
Dept: URBAN - METROPOLITAN AREA SURGERY CENTER 14 | Facility: SURGERY CENTER | Age: 57
End: 2022-05-10
Payer: COMMERCIAL

## 2022-05-10 DIAGNOSIS — R10.32 ABDOMINAL CRAMPING IN LEFT LOWER QUADRANT: ICD-10-CM

## 2022-05-10 DIAGNOSIS — R19.7 ACUTE DIARRHEA: ICD-10-CM

## 2022-05-10 PROCEDURE — 45378 DIAGNOSTIC COLONOSCOPY: CPT | Performed by: INTERNAL MEDICINE

## 2022-05-10 PROCEDURE — G8907 PT DOC NO EVENTS ON DISCHARG: HCPCS | Performed by: INTERNAL MEDICINE

## 2022-05-10 RX ORDER — RIZATRIPTAN BENZOATE 10 MG/1
TABLET ORAL
Qty: 9 DELAYED RELEASE TABLET | Refills: 2 | COMMUNITY

## 2022-05-10 RX ORDER — ONDANSETRON 8 MG/1
TABLET, ORALLY DISINTEGRATING ORAL
Qty: 30 UNSPECIFIED | Refills: 0 | COMMUNITY

## 2022-05-10 RX ORDER — TOPIRAMATE 25 MG/1
TABLET, COATED ORAL
Qty: 0 | Refills: 0 | COMMUNITY

## 2022-05-10 RX ORDER — LISINOPRIL 10 MG/1
TABLET ORAL
Qty: 0 | Refills: 0 | COMMUNITY

## 2022-05-10 RX ORDER — FENOFIBRATE 145 MG/1
TABLET, FILM COATED ORAL
Qty: 90 DELAYED RELEASE TABLET | Refills: 0 | COMMUNITY

## 2022-05-10 RX ORDER — LANSOPRAZOLE 30 MG/1
1 CAPSULE BEFORE A MEAL CAPSULE, DELAYED RELEASE ORAL ONCE A DAY
Qty: 90 CAPSULE | Refills: 1 | COMMUNITY

## 2022-05-10 RX ORDER — METOCLOPRAMIDE 10 MG/1
1 TABLET BEFORE STARTING COLON PREP TABLET ORAL ONCE
Qty: 1 TABLET | Refills: 0 | Status: ACTIVE | COMMUNITY
Start: 2022-04-19

## 2022-05-10 RX ORDER — TEMAZEPAM 15 MG/1
(SCHEDULE IV DRUG) CAPSULE ORAL
Qty: 30 CAP | Refills: 0 | COMMUNITY

## 2022-05-10 RX ORDER — ERGOCALCIFEROL 1.25 MG/1
CAPSULE ORAL
Qty: 12 CAP | Refills: 0 | COMMUNITY

## 2022-05-10 RX ORDER — VEDOLIZUMAB 300 MG/5ML
INFUSE 300 MG OVER 30 MINUTE(S) BY INTRAVENOUS ROUTE EVERY 8 WEEKS INJECTION, POWDER, LYOPHILIZED, FOR SOLUTION INTRAVENOUS
Qty: 1 | Refills: 11 | COMMUNITY

## 2022-05-25 ENCOUNTER — OFFICE VISIT (OUTPATIENT)
Dept: URBAN - NONMETROPOLITAN AREA CLINIC 4 | Facility: CLINIC | Age: 57
End: 2022-05-25

## 2023-08-11 ENCOUNTER — OFFICE VISIT (OUTPATIENT)
Dept: URBAN - NONMETROPOLITAN AREA CLINIC 4 | Facility: CLINIC | Age: 58
End: 2023-08-11
Payer: COMMERCIAL

## 2023-08-11 VITALS
TEMPERATURE: 96.1 F | WEIGHT: 218 LBS | SYSTOLIC BLOOD PRESSURE: 151 MMHG | HEART RATE: 94 BPM | HEIGHT: 74 IN | BODY MASS INDEX: 27.98 KG/M2 | DIASTOLIC BLOOD PRESSURE: 89 MMHG

## 2023-08-11 DIAGNOSIS — K51.90 ULCERATIVE COLITIS: ICD-10-CM

## 2023-08-11 DIAGNOSIS — R10.32 LLQ PAIN: ICD-10-CM

## 2023-08-11 DIAGNOSIS — K59.03 DRUG-INDUCED CONSTIPATION: ICD-10-CM

## 2023-08-11 PROBLEM — 301716002: Status: ACTIVE | Noted: 2023-08-11

## 2023-08-11 PROBLEM — 64766004 ULCERATIVE COLITIS: Status: ACTIVE | Noted: 2021-03-09

## 2023-08-11 PROBLEM — 62315008: Status: ACTIVE | Noted: 2023-08-11

## 2023-08-11 PROCEDURE — 99214 OFFICE O/P EST MOD 30 MIN: CPT | Performed by: PHYSICIAN ASSISTANT

## 2023-08-11 RX ORDER — TOPIRAMATE 25 MG/1
TABLET, COATED ORAL
Qty: 0 | Refills: 0 | Status: ACTIVE | COMMUNITY

## 2023-08-11 RX ORDER — AMOXICILLIN 875 MG/1
1 TABLET TABLET, FILM COATED ORAL TWICE A DAY
Status: ACTIVE | COMMUNITY

## 2023-08-11 RX ORDER — LISINOPRIL 10 MG/1
TABLET ORAL
Qty: 0 | Refills: 0 | Status: ACTIVE | COMMUNITY

## 2023-08-11 RX ORDER — RIZATRIPTAN BENZOATE 10 MG/1
TABLET ORAL
Qty: 9 DELAYED RELEASE TABLET | Refills: 2 | Status: ACTIVE | COMMUNITY

## 2023-08-11 RX ORDER — ONDANSETRON 8 MG/1
TABLET, ORALLY DISINTEGRATING ORAL
Qty: 30 UNSPECIFIED | Refills: 0 | Status: ACTIVE | COMMUNITY

## 2023-08-11 RX ORDER — LACTULOSE 10 G/15ML
15 ML AS NEEDED SOLUTION ORAL ONCE
Qty: 200 MILLILITER | Refills: 1 | OUTPATIENT
Start: 2023-08-11 | End: 2023-10-10

## 2023-08-11 RX ORDER — BACITRACIN ZINC, NEOMYCIN SULFATE, POLYMYXIN B SULFATE 500; 3.5; 1 [IU]/G; MG/G; [IU]/G
2 TABLETS AT BEDTIME AS NEEDED OINTMENT TOPICAL ONCE A DAY
Status: ACTIVE | COMMUNITY

## 2023-08-11 RX ORDER — VEDOLIZUMAB 300 MG/5ML
INFUSE 300 MG OVER 30 MINUTE(S) BY INTRAVENOUS ROUTE EVERY 8 WEEKS INJECTION, POWDER, LYOPHILIZED, FOR SOLUTION INTRAVENOUS
Qty: 1 | Refills: 11 | Status: ACTIVE | COMMUNITY

## 2023-08-11 RX ORDER — TEMAZEPAM 15 MG/1
(SCHEDULE IV DRUG) CAPSULE ORAL
Qty: 30 CAP | Refills: 0 | Status: ACTIVE | COMMUNITY

## 2023-08-11 RX ORDER — METOCLOPRAMIDE 10 MG/1
1 TABLET BEFORE STARTING COLON PREP TABLET ORAL ONCE
Qty: 1 TABLET | Refills: 0 | Status: ACTIVE | COMMUNITY
Start: 2022-04-19

## 2023-08-11 RX ORDER — ERGOCALCIFEROL 1.25 MG/1
CAPSULE ORAL
Qty: 12 CAP | Refills: 0 | Status: ACTIVE | COMMUNITY

## 2023-08-11 RX ORDER — TAMSULOSIN HYDROCHLORIDE 0.4 MG/1
1 CAPSULE CAPSULE ORAL ONCE A DAY
Status: ACTIVE | COMMUNITY

## 2023-08-11 RX ORDER — LIDOCAINE 140 MG/1
1 PATCH REMOVE AFTER 12 HOURS PATCH CUTANEOUS ONCE A DAY
Qty: 5 | Refills: 0 | OUTPATIENT
Start: 2023-08-11

## 2023-08-11 RX ORDER — FENOFIBRATE 145 MG/1
TABLET, FILM COATED ORAL
Qty: 90 DELAYED RELEASE TABLET | Refills: 0 | Status: ACTIVE | COMMUNITY

## 2023-08-11 RX ORDER — LANSOPRAZOLE 30 MG/1
1 CAPSULE BEFORE A MEAL CAPSULE, DELAYED RELEASE ORAL ONCE A DAY
Qty: 90 CAPSULE | Refills: 1 | Status: ACTIVE | COMMUNITY

## 2023-08-11 NOTE — PHYSICAL EXAM GASTROINTESTINAL
Abdomen,  soft, nontender, distended,  normal bowel sounds,  Liver and Spleen,  no hepatomegaly present

## 2023-08-11 NOTE — HPI-OTHER HISTORIES
Altagracia is a 55 yo white man who presents for transition of care. He carries a diagnosis of UC (pancolitis) since 2016. His main complaint has always been abdominal pain with diarrhea but denies rectal bleeding.  He was maintained on Azathiproine which was discontinued due to side effects earlier in 2020. He reports significant relief of abdominal complaints since introduction of Entyvio in March/April 2020. He is on Q 8 weeks frequency. However the last 10 days before his infusion are hard due to recurrence of symptoms. His next infusion in 9/17/20. No fevers, chills, weight loss. He has more energy after being on Entyvio. Last colonoscopy in 2018 showed Arcos 0 disease. No family history of IBD.   3/17/21: Pt presents for urgent visit. He reports not feeling well since January after missing Entyvio inf in Dec. He has lost nearly 10 lbs since then. His last inf was on 3/11 and since then, he report worsening abdominal pain, N/V, diarrhea and hematochezia. His wife tried to get him to go to ER, but he refused. He reports 3 episodes of hematemesis last week. He has been taking Zofran and Phenergan as needed. He has not had a BM since Sunday, but continues to c/o diffuse abdominal pain. His last colonoscopy was in 2018.  Follow Up 6/3/21: Patient presents for routine follow up. He received Entyvio 2 weeks ago and developed headache, nausea and rectal bleeding soon afterward. He had to visit ED. These symptoms lasted 3 days and resolved with Phenergan, Bentyl and Zofran. He does not feel quiet 100% yet. EGD and Colonoscopy were unremarkable in March 2021. He completed a prednisone taper in March 2021 (no GI symptoms on prednisone). FC normal in March 2021. He denies any fever, chills, weight loss.  4/19/22: Pt presents for f/u of divertculitis. Labs and CT scan ordered by PCP recently.  Per labs 4/12/22, CRP elevated at 4.43 and sed rate elevated at 20. CBC and CMP wnl.   CT A/P 4/12/22 IMPRESSION: 1. Thickening of wall of unopacified distal descending and proximal sigmoid colon with diverticula noted within these segments. Pericolonic inflammation noted as well as reactive thickening of peritoneal fascia related to inflammation. Findings consistent with acute uncomplicated diverticulitis with epicenter near junction of distal descending and proximal sigmoid colonic segments. 2. Slight uniform thickening of wall of distal sigmoid colon and rectum likely on the basis of changes associated with ulcerative colitis. Unopacified right colon and terminal ileum appear grossly normal. 3. No abscess, free air, or free fluid noted. 4. Fatty liver.   He completed Cipro/Flagyl. He feels better overall, but continues to c/o loose stools. Stools are pudding consistency. Went from having over 10 BMs daily to now having 4-6 BMs daily. Denies hematochezia. He has mild LLQ pain. Denies N/V.   8.11.23  last cscope 5/2022 with no evidence of active UC  just in hospital for ureteral stone: 4 x 2 mm obstructing stone at the left UVJ with mild left hydronephrosis and urothelial thickening. Mildly obstructing 4 x 3 mm right UPJ stone with urothelial thickening. Mild bladder wall thickening. Diverticulosis. Wispy density adjacent to the cecum probably a tiny normal appendix. No small bowel dilatation. No adenopathy or ascites. Remote mild compression deformity of the superior endplate of T12. L2 hemangioma. Degenerative changes at L5/S1. Soft tissues are unremarkable.   IMPRESSION: Bilateral obstructing ureteral stones with mild hydronephrosis. 4 x 3 mm right UPJ stone and 4 x 2 mm left UVJ stone.  Now with constipation after getting DIlaudid in the ER< worsening after right shoulder shoulder surgery.  Passing gas, no BM in 3 days

## 2023-08-16 ENCOUNTER — TELEPHONE ENCOUNTER (OUTPATIENT)
Dept: URBAN - NONMETROPOLITAN AREA CLINIC 4 | Facility: CLINIC | Age: 58
End: 2023-08-16

## 2023-08-16 RX ORDER — NALOXEGOL OXALATE 12.5 MG/1
1 TABLET IN THE MORNING TABLET, FILM COATED ORAL ONCE A DAY
Qty: 30 | OUTPATIENT
Start: 2023-08-21 | End: 2023-09-20

## 2024-03-04 ENCOUNTER — OV EP (OUTPATIENT)
Dept: URBAN - NONMETROPOLITAN AREA CLINIC 4 | Facility: CLINIC | Age: 59
End: 2024-03-04
Payer: COMMERCIAL

## 2024-03-04 VITALS
WEIGHT: 227.2 LBS | TEMPERATURE: 97.5 F | BODY MASS INDEX: 29.16 KG/M2 | SYSTOLIC BLOOD PRESSURE: 144 MMHG | HEART RATE: 85 BPM | HEIGHT: 74 IN | DIASTOLIC BLOOD PRESSURE: 84 MMHG

## 2024-03-04 DIAGNOSIS — R10.32 LLQ PAIN: ICD-10-CM

## 2024-03-04 DIAGNOSIS — K59.03 DRUG-INDUCED CONSTIPATION: ICD-10-CM

## 2024-03-04 DIAGNOSIS — K51.90 ULCERATIVE COLITIS: ICD-10-CM

## 2024-03-04 PROCEDURE — 99214 OFFICE O/P EST MOD 30 MIN: CPT | Performed by: PHYSICIAN ASSISTANT

## 2024-03-04 RX ORDER — METOCLOPRAMIDE 10 MG/1
1 TABLET BEFORE STARTING COLON PREP TABLET ORAL ONCE
Qty: 1 TABLET | Refills: 0 | Status: ACTIVE | COMMUNITY
Start: 2022-04-19

## 2024-03-04 RX ORDER — TAMSULOSIN HYDROCHLORIDE 0.4 MG/1
1 CAPSULE CAPSULE ORAL ONCE A DAY
Status: DISCONTINUED | COMMUNITY

## 2024-03-04 RX ORDER — AMOXICILLIN 875 MG/1
1 TABLET TABLET, FILM COATED ORAL TWICE A DAY
Status: DISCONTINUED | COMMUNITY

## 2024-03-04 RX ORDER — LANSOPRAZOLE 30 MG/1
1 CAPSULE BEFORE A MEAL CAPSULE, DELAYED RELEASE ORAL ONCE A DAY
Qty: 90 CAPSULE | Refills: 1 | Status: ACTIVE | COMMUNITY

## 2024-03-04 RX ORDER — BACITRACIN ZINC, NEOMYCIN SULFATE, POLYMYXIN B SULFATE 500; 3.5; 1 [IU]/G; MG/G; [IU]/G
2 TABLETS AT BEDTIME AS NEEDED OINTMENT TOPICAL ONCE A DAY
Status: ACTIVE | COMMUNITY

## 2024-03-04 RX ORDER — TEMAZEPAM 15 MG/1
(SCHEDULE IV DRUG) CAPSULE ORAL
Qty: 30 CAP | Refills: 0 | Status: ACTIVE | COMMUNITY

## 2024-03-04 RX ORDER — VEDOLIZUMAB 300 MG/5ML
INFUSE 300 MG OVER 30 MINUTE(S) BY INTRAVENOUS ROUTE EVERY 8 WEEKS INJECTION, POWDER, LYOPHILIZED, FOR SOLUTION INTRAVENOUS
Qty: 1 | Refills: 11 | Status: DISCONTINUED | COMMUNITY

## 2024-03-04 RX ORDER — FENOFIBRATE 145 MG/1
TABLET, FILM COATED ORAL
Qty: 90 DELAYED RELEASE TABLET | Refills: 0 | Status: ACTIVE | COMMUNITY

## 2024-03-04 RX ORDER — LISINOPRIL 10 MG/1
TABLET ORAL
Qty: 0 | Refills: 0 | Status: ACTIVE | COMMUNITY

## 2024-03-04 RX ORDER — LIDOCAINE 140 MG/1
1 PATCH REMOVE AFTER 12 HOURS PATCH CUTANEOUS ONCE A DAY
Qty: 5 | Refills: 0 | Status: DISCONTINUED | COMMUNITY
Start: 2023-08-11

## 2024-03-04 RX ORDER — RIZATRIPTAN BENZOATE 10 MG/1
TABLET ORAL
Qty: 9 DELAYED RELEASE TABLET | Refills: 2 | Status: ACTIVE | COMMUNITY

## 2024-03-04 RX ORDER — TOPIRAMATE 25 MG/1
TABLET, COATED ORAL
Qty: 0 | Refills: 0 | Status: ACTIVE | COMMUNITY

## 2024-03-04 RX ORDER — RIFAXIMIN 550 MG/1
1 TABLET TABLET ORAL TWICE A DAY
Qty: 60 | OUTPATIENT
Start: 2024-03-04 | End: 2024-04-03

## 2024-03-04 RX ORDER — ONDANSETRON 8 MG/1
TABLET, ORALLY DISINTEGRATING ORAL
Qty: 30 UNSPECIFIED | Refills: 0 | Status: ACTIVE | COMMUNITY

## 2024-03-04 RX ORDER — ERGOCALCIFEROL 1.25 MG/1
CAPSULE ORAL
Qty: 12 CAP | Refills: 0 | Status: ACTIVE | COMMUNITY

## 2024-03-04 NOTE — HPI-OTHER HISTORIES
Altagracia is a 53 yo white man who presents for transition of care. He carries a diagnosis of UC (pancolitis) since 2016. His main complaint has always been abdominal pain with diarrhea but denies rectal bleeding.  He was maintained on Azathiproine which was discontinued due to side effects earlier in 2020. He reports significant relief of abdominal complaints since introduction of Entyvio in March/April 2020. He is on Q 8 weeks frequency. However the last 10 days before his infusion are hard due to recurrence of symptoms. His next infusion in 9/17/20. No fevers, chills, weight loss. He has more energy after being on Entyvio. Last colonoscopy in 2018 showed Arcos 0 disease. No family history of IBD.   3/17/21: Pt presents for urgent visit. He reports not feeling well since January after missing Entyvio inf in Dec. He has lost nearly 10 lbs since then. His last inf was on 3/11 and since then, he report worsening abdominal pain, N/V, diarrhea and hematochezia. His wife tried to get him to go to ER, but he refused. He reports 3 episodes of hematemesis last week. He has been taking Zofran and Phenergan as needed. He has not had a BM since Sunday, but continues to c/o diffuse abdominal pain. His last colonoscopy was in 2018.  Follow Up 6/3/21: Patient presents for routine follow up. He received Entyvio 2 weeks ago and developed headache, nausea and rectal bleeding soon afterward. He had to visit ED. These symptoms lasted 3 days and resolved with Phenergan, Bentyl and Zofran. He does not feel quiet 100% yet. EGD and Colonoscopy were unremarkable in March 2021. He completed a prednisone taper in March 2021 (no GI symptoms on prednisone). FC normal in March 2021. He denies any fever, chills, weight loss.  4/19/22: Pt presents for f/u of divertculitis. Labs and CT scan ordered by PCP recently.  Per labs 4/12/22, CRP elevated at 4.43 and sed rate elevated at 20. CBC and CMP wnl.   CT A/P 4/12/22 IMPRESSION: 1. Thickening of wall of unopacified distal descending and proximal sigmoid colon with diverticula noted within these segments. Pericolonic inflammation noted as well as reactive thickening of peritoneal fascia related to inflammation. Findings consistent with acute uncomplicated diverticulitis with epicenter near junction of distal descending and proximal sigmoid colonic segments. 2. Slight uniform thickening of wall of distal sigmoid colon and rectum likely on the basis of changes associated with ulcerative colitis. Unopacified right colon and terminal ileum appear grossly normal. 3. No abscess, free air, or free fluid noted. 4. Fatty liver.   He completed Cipro/Flagyl. He feels better overall, but continues to c/o loose stools. Stools are pudding consistency. Went from having over 10 BMs daily to now having 4-6 BMs daily. Denies hematochezia. He has mild LLQ pain. Denies N/V.   8.11.23  last cscope 5/2022 with no evidence of active UC  just in hospital for ureteral stone: 4 x 2 mm obstructing stone at the left UVJ with mild left hydronephrosis and urothelial thickening. Mildly obstructing 4 x 3 mm right UPJ stone with urothelial thickening. Mild bladder wall thickening. Diverticulosis. Wispy density adjacent to the cecum probably a tiny normal appendix. No small bowel dilatation. No adenopathy or ascites. Remote mild compression deformity of the superior endplate of T12. L2 hemangioma. Degenerative changes at L5/S1. Soft tissues are unremarkable.   IMPRESSION: Bilateral obstructing ureteral stones with mild hydronephrosis. 4 x 3 mm right UPJ stone and 4 x 2 mm left UVJ stone.  Now with constipation after getting DIlaudid in the ER< worsening after right shoulder shoulder surgery.  Passing gas, no BM in 3 days  3.4.24 was doing well for months, 2 weeks ago had severe pain with nausea/vomiting- now resoled  prev thought to have UC, last cscope with no evidence of active disease.  Also possible viral  Did have admission for urosepsis from [facility] w obstrction